# Patient Record
Sex: MALE | Race: WHITE | NOT HISPANIC OR LATINO | Employment: UNEMPLOYED | URBAN - METROPOLITAN AREA
[De-identification: names, ages, dates, MRNs, and addresses within clinical notes are randomized per-mention and may not be internally consistent; named-entity substitution may affect disease eponyms.]

---

## 2021-11-10 ENCOUNTER — OFFICE VISIT (OUTPATIENT)
Dept: URGENT CARE | Facility: CLINIC | Age: 51
End: 2021-11-10

## 2021-11-10 VITALS
RESPIRATION RATE: 16 BRPM | SYSTOLIC BLOOD PRESSURE: 120 MMHG | WEIGHT: 171 LBS | HEIGHT: 70 IN | TEMPERATURE: 97.5 F | HEART RATE: 90 BPM | DIASTOLIC BLOOD PRESSURE: 80 MMHG | OXYGEN SATURATION: 95 % | BODY MASS INDEX: 24.48 KG/M2

## 2021-11-10 DIAGNOSIS — S42.492A OTHER CLOSED DISPLACED FRACTURE OF DISTAL END OF LEFT HUMERUS, INITIAL ENCOUNTER: ICD-10-CM

## 2021-11-10 PROCEDURE — 29105 APPLICATION LONG ARM SPLINT: CPT | Performed by: PHYSICIAN ASSISTANT

## 2021-11-10 PROCEDURE — 99204 OFFICE O/P NEW MOD 45 MIN: CPT | Mod: 25 | Performed by: PHYSICIAN ASSISTANT

## 2021-11-10 RX ORDER — HYDROCODONE BITARTRATE AND ACETAMINOPHEN 5; 325 MG/1; MG/1
1 TABLET ORAL EVERY 8 HOURS PRN
Qty: 12 TABLET | Refills: 0 | Status: SHIPPED | OUTPATIENT
Start: 2021-11-10 | End: 2021-11-14

## 2021-11-10 RX ORDER — SUMATRIPTAN 50 MG/1
TABLET, FILM COATED ORAL
COMMUNITY
Start: 2021-01-07 | End: 2021-12-31

## 2021-11-10 ASSESSMENT — ENCOUNTER SYMPTOMS
CHILLS: 0
FEVER: 0
ARTHRALGIAS: 1
TINGLING: 0
SENSORY CHANGE: 0
NUMBNESS: 0
NAUSEA: 0
JOINT SWELLING: 1
WEAKNESS: 0
VOMITING: 0

## 2021-11-11 NOTE — PROGRESS NOTES
"Subjective     Nathanael Roe is a 51 y.o. male who presents with Arm Injury (fell, landed on arm backwards)            Arm Injury  This is a new problem. The current episode started today (4-5 hours ago. Patient fell while skateboarding and landed with left arm in back of him- left elbow pain ). The problem occurs constantly. The problem has been unchanged. Associated symptoms include arthralgias and joint swelling. Pertinent negatives include no chills, fever, nausea, numbness, vomiting or weakness. Exacerbated by: any movement of left elbow. He has tried NSAIDs (one ibuprofen and one Aleve) for the symptoms. The treatment provided mild relief.       No past medical history on file.    No past surgical history on file.    No family history on file.    Not on File    Medications, Allergies, and current problem list reviewed today in Epic    Review of Systems   Constitutional: Negative for chills and fever.   Gastrointestinal: Negative for nausea and vomiting.   Musculoskeletal: Positive for arthralgias, joint pain (left elbow) and joint swelling.   Neurological: Negative for tingling, sensory change, weakness and numbness.     All other systems reviewed and are negative.            Objective     /80   Pulse 90   Temp 36.4 °C (97.5 °F) (Temporal)   Resp 16   Ht 1.778 m (5' 10\")   Wt 77.6 kg (171 lb)   SpO2 95%   BMI 24.54 kg/m²      Physical Exam  Constitutional:       General: He is not in acute distress.  HENT:      Head: Normocephalic and atraumatic.   Eyes:      Conjunctiva/sclera: Conjunctivae normal.   Cardiovascular:      Rate and Rhythm: Normal rate.   Pulmonary:      Effort: Pulmonary effort is normal. No respiratory distress.   Musculoskeletal:      Left elbow: Swelling (moderate edema over left lateral epicondyle.), deformity and effusion present. Decreased range of motion (No ROM due to pain ). Tenderness present in radial head, medial epicondyle, lateral epicondyle and olecranon " process.      Comments: Left hand with distal n/v intact and brisk capillary refill in all fingers. Radial pulse 2+   Skin:     General: Skin is warm and dry.   Neurological:      General: No focal deficit present.      Mental Status: He is alert and oriented to person, place, and time.   Psychiatric:         Mood and Affect: Mood normal.         Behavior: Behavior normal.         Thought Content: Thought content normal.         Judgment: Judgment normal.                   11/10/2021 6:24 PM     HISTORY/REASON FOR EXAM:  Pain/Deformity Following Trauma.  Fall from scooter.  LEFT elbow injury.     TECHNIQUE/EXAM DESCRIPTION AND NUMBER OF VIEWS:  3 views of the LEFT elbow.     COMPARISON: None     FINDINGS:  Diffuse swelling about the elbow with displaced anterior fat pad.  Comminuted and displaced fracture of the lateral humeral condyle extending into the trochlea.  No dislocation.  Proximal radius and ulna are intact.     IMPRESSION:     1.  Comminuted and displaced fracture of the distal LEFT humerus involving the lateral condyle and trochlea.  2.  No dislocation.             Assessment & Plan        1. Other closed displaced fracture of distal end of left humerus, initial encounter  DX-ELBOW-COMPLETE 3+ LEFT    HYDROcodone-acetaminophen (NORCO) 5-325 MG Tab per tablet       Discussed case with Dr. Kostas Jackson- orthopedist on call for [facility]. Patient needs surgical intervention. Dr. Jackson agrees to see patient tomorrow in clinic.  Patient was given Address and number to Physician's clinic.     Long arm splint placed by me.     Current Outpatient Medications:   •  HYDROcodone-acetaminophen (NORCO) 5-325 MG Tab per tablet, Take 1 Tablet by mouth every 8 hours as needed for up to 4 days., Disp: 12 Tablet, Rfl: 0    Sedation side effects discussed. No Driving or alcohol with medication given.    Opiate consent form signed and scanned.    My total time spent caring for the patient on the day of the  encounter was >45 minutes.   This does not include time spent on separately billable procedures/tests.    Differential diagnoses, Supportive care, and indications for immediate follow-up discussed with patient.   Pathogenesis of diagnosis discussed including typical length and natural progression.   Instructed to return to clinic or nearest emergency department for any change in condition, further concerns, or worsening of symptoms.    The patient demonstrated a good understanding and agreed with the treatment plan.    Rashmi Gibson P.A.-C.

## 2021-12-01 ENCOUNTER — APPOINTMENT (OUTPATIENT)
Dept: RADIOLOGY | Facility: MEDICAL CENTER | Age: 51
End: 2021-12-01
Attending: EMERGENCY MEDICINE

## 2021-12-01 ENCOUNTER — HOSPITAL ENCOUNTER (EMERGENCY)
Facility: MEDICAL CENTER | Age: 51
End: 2021-12-01
Attending: EMERGENCY MEDICINE

## 2021-12-01 VITALS
RESPIRATION RATE: 16 BRPM | TEMPERATURE: 97.3 F | OXYGEN SATURATION: 97 % | BODY MASS INDEX: 23.5 KG/M2 | DIASTOLIC BLOOD PRESSURE: 80 MMHG | HEART RATE: 80 BPM | SYSTOLIC BLOOD PRESSURE: 128 MMHG | WEIGHT: 163.8 LBS

## 2021-12-01 DIAGNOSIS — S42.402A ELBOW FRACTURE, LEFT, CLOSED, INITIAL ENCOUNTER: ICD-10-CM

## 2021-12-01 PROCEDURE — 29105 APPLICATION LONG ARM SPLINT: CPT

## 2021-12-01 PROCEDURE — 99283 EMERGENCY DEPT VISIT LOW MDM: CPT

## 2021-12-01 PROCEDURE — 73080 X-RAY EXAM OF ELBOW: CPT | Mod: LT

## 2021-12-01 PROCEDURE — 302874 HCHG BANDAGE ACE 2 OR 3""

## 2021-12-01 ASSESSMENT — PAIN DESCRIPTION - PAIN TYPE: TYPE: ACUTE PAIN

## 2021-12-01 NOTE — ED TRIAGE NOTES
"Ambulates to triage  Chief Complaint   Patient presents with   • Arm Pain     L arm, broke his elbow 2 weeks ago, [facility] removed cast yesterday, increased pain to L arm       Pt said he rolled over in bed last night on his arm and today he feels like his fingers are \"dead.\" Denies any numbness or tingling.  "

## 2021-12-01 NOTE — ED NOTES
Posterior long orthoglass splint applied to patients Left arm without incident. CMS intact prior to and after splint application. Splint education provided. Patient placed in previously provided arm sling that patient brought in prior to arrival.

## 2021-12-01 NOTE — ED NOTES
erp at bedside. Agree with triage notes. Call light within reach. Instructed to call staff for assistance.

## 2021-12-01 NOTE — ED PROVIDER NOTES
ED Provider Note    CHIEF COMPLAINT  Chief Complaint   Patient presents with   • Arm Pain     L arm, broke his elbow 2 weeks ago, [facility] removed cast yesterday, increased pain to L arm       HPI  Nathanael Roe is a 51 y.o. male who presents for evaluation of recheck of left elbow injury.  The patient broke his left distal humerus around 2 weeks ago.  He was placed in a splint and subsequently casted through [facility].  He had a follow-up appoint with Dr. Jackson yesterday.  He indicated that the patient would potentially need surgery pending repeat imaging in 2 weeks.  The patient apparently rolled over in bed and had increased pain and swelling to the left arm.  He is currently not in any sort of cast or splint.  No numbness tingling or weakness no other symptoms reported  REVIEW OF SYSTEMS  See HPI for further details.  No fevers chills night sweats weight loss all other systems are negative.     PAST MEDICAL HISTORY  No past medical history on file.  Noncontributory  FAMILY HISTORY  None reported    SOCIAL HISTORY  Social History     Socioeconomic History   • Marital status: Single     Spouse name: Not on file   • Number of children: Not on file   • Years of education: Not on file   • Highest education level: Not on file   Occupational History   • Not on file   Tobacco Use   • Smoking status: Never Smoker   • Smokeless tobacco: Never Used   Substance and Sexual Activity   • Alcohol use: Never   • Drug use: Yes     Types: Inhaled     Comment: occ marijuana   • Sexual activity: Not on file   Other Topics Concern   • Not on file   Social History Narrative   • Not on file     Social Determinants of Health     Financial Resource Strain:    • Difficulty of Paying Living Expenses: Not on file   Food Insecurity:    • Worried About Running Out of Food in the Last Year: Not on file   • Ran Out of Food in the Last Year: Not on file   Transportation Needs:    • Lack of Transportation (Medical): Not  on file   • Lack of Transportation (Non-Medical): Not on file   Physical Activity:    • Days of Exercise per Week: Not on file   • Minutes of Exercise per Session: Not on file   Stress:    • Feeling of Stress : Not on file   Social Connections:    • Frequency of Communication with Friends and Family: Not on file   • Frequency of Social Gatherings with Friends and Family: Not on file   • Attends Taoist Services: Not on file   • Active Member of Clubs or Organizations: Not on file   • Attends Club or Organization Meetings: Not on file   • Marital Status: Not on file   Intimate Partner Violence:    • Fear of Current or Ex-Partner: Not on file   • Emotionally Abused: Not on file   • Physically Abused: Not on file   • Sexually Abused: Not on file   Housing Stability:    • Unable to Pay for Housing in the Last Year: Not on file   • Number of Places Lived in the Last Year: Not on file   • Unstable Housing in the Last Year: Not on file       SURGICAL HISTORY  No past surgical history on file.  No major surgeries  CURRENT MEDICATIONS  Home Medications     Reviewed by Ana Welch R.N. (Registered Nurse) on 12/01/21 at 0737  Med List Status: Partial   Medication Last Dose Status   SUMAtriptan (IMITREX) 50 MG Tab  Active                ALLERGIES  Allergies   Allergen Reactions   • Penicillins        PHYSICAL EXAM  VITAL SIGNS: /83   Pulse 80   Temp (!) 35.7 °C (96.3 °F) (Temporal)   Resp 20   Wt 74.3 kg (163 lb 12.8 oz)   SpO2 97%   BMI 23.50 kg/m²       Constitutional: Well developed, Well nourished, No acute distress, Non-toxic appearance.   HENT: Normocephalic, Atraumatic, Bilateral external ears normal, Oropharynx moist, No oral exudates, Nose normal.   Eyes: PERRLA, EOMI, Conjunctiva normal, No discharge.   Neck: Normal range of motion, No tenderness, Supple, No stridor.   Cardiovascular: Normal heart rate, Normal rhythm, No murmurs, No rubs, No gallops.   Thorax & Lungs: Normal breath sounds, No  respiratory distress, No wheezing, No chest tenderness.   Abdomen: Bowel sounds normal, Soft, No tenderness, No masses, No pulsatile masses.   Skin: Warm, Dry, No erythema, No rash.   Extremities: Left upper extremity is notable for circumferential swelling around the elbow.  There is no erythema or warmth.  Pain with flexion extension is noted.  No suggestion of dislocation.  Neurovascular exam of the forearm hand and wrist is normal   neurologic: Alert & oriented x 3, Normal motor function, Normal sensory function, No focal deficits noted.   Psychiatric: Anxious      RADIOLOGY/PROCEDURES  DX-ELBOW-COMPLETE 3+ LEFT   Final Result      Lateral humeral epicondyle fracture            COURSE & MEDICAL DECISION MAKING  Pertinent Labs & Imaging studies reviewed. (See chart for details)  I reviewed the patient's initial radiographs as well as his notes from the orthopedic clinic yesterday.  He is neurovascularly intact no suggestion of infection.  Radiograph does not demonstrate any dislocation or any new process.  We will place him a posterior long-arm splint and recommend he follow-up with his established orthopedic surgeon in 2 weeks as planned    FINAL IMPRESSION  1.  Subsequent encounter for distal humerus fracture without displacement         Electronically signed by: Landon Salcedo M.D., 12/1/2021 8:39 AM

## 2021-12-01 NOTE — ED NOTES
Discharge instructions given to pt including follow up with ortho or returning if no improvement of symptoms or to return if worse.  Questions answered by RN. Denies any new complaints. Discharged w/stable vitals and able to ambulate to the lobby w/steady gait. Cms intact in lue. Splint intact.